# Patient Record
Sex: MALE | Race: BLACK OR AFRICAN AMERICAN | NOT HISPANIC OR LATINO | URBAN - METROPOLITAN AREA
[De-identification: names, ages, dates, MRNs, and addresses within clinical notes are randomized per-mention and may not be internally consistent; named-entity substitution may affect disease eponyms.]

---

## 2018-12-21 ENCOUNTER — EMERGENCY (EMERGENCY)
Facility: HOSPITAL | Age: 68
LOS: 1 days | Discharge: HOME | End: 2018-12-21
Attending: EMERGENCY MEDICINE
Payer: MEDICARE

## 2018-12-21 VITALS
RESPIRATION RATE: 16 BRPM | SYSTOLIC BLOOD PRESSURE: 152 MMHG | WEIGHT: 205.03 LBS | HEIGHT: 70 IN | DIASTOLIC BLOOD PRESSURE: 74 MMHG | OXYGEN SATURATION: 97 % | TEMPERATURE: 97 F | HEART RATE: 78 BPM

## 2018-12-21 DIAGNOSIS — D17.9 BENIGN LIPOMATOUS NEOPLASM, UNSPECIFIED: Chronic | ICD-10-CM

## 2018-12-21 DIAGNOSIS — Z98.89 OTHER SPECIFIED POSTPROCEDURAL STATES: Chronic | ICD-10-CM

## 2018-12-21 DIAGNOSIS — Z95.1 PRESENCE OF AORTOCORONARY BYPASS GRAFT: Chronic | ICD-10-CM

## 2018-12-21 PROCEDURE — 99284 EMERGENCY DEPT VISIT MOD MDM: CPT

## 2018-12-21 NOTE — ED ADULT TRIAGE NOTE - CHIEF COMPLAINT QUOTE
pt arrive by ambulance stating "I don't feel too well", states he was planning to go out tonight but when he was ready to get his shoes on he realized "maybe I drank too many beers". as per EMS witnesses at home reported pt passed out for couple seconds while bending down to put shoes on.

## 2018-12-21 NOTE — ED ADULT NURSE REASSESSMENT NOTE - NS ED NURSE REASSESS COMMENT FT1
pt uncooperative and refusing to remove clothing or allow staff to evaluate. MD Weathers made aware, will evaluate. will continue to monitor.

## 2018-12-22 VITALS
TEMPERATURE: 98 F | RESPIRATION RATE: 18 BRPM | SYSTOLIC BLOOD PRESSURE: 149 MMHG | OXYGEN SATURATION: 100 % | HEART RATE: 89 BPM | DIASTOLIC BLOOD PRESSURE: 72 MMHG

## 2018-12-22 LAB
ALBUMIN SERPL ELPH-MCNC: 4 G/DL — SIGNIFICANT CHANGE UP (ref 3.3–5.2)
ALP SERPL-CCNC: 58 U/L — SIGNIFICANT CHANGE UP (ref 40–120)
ALT FLD-CCNC: 39 U/L — SIGNIFICANT CHANGE UP
ANION GAP SERPL CALC-SCNC: 12 MMOL/L — SIGNIFICANT CHANGE UP (ref 5–17)
AST SERPL-CCNC: 29 U/L — SIGNIFICANT CHANGE UP
BASOPHILS # BLD AUTO: 0 K/UL — SIGNIFICANT CHANGE UP (ref 0–0.2)
BASOPHILS NFR BLD AUTO: 0.4 % — SIGNIFICANT CHANGE UP (ref 0–2)
BILIRUB SERPL-MCNC: 0.5 MG/DL — SIGNIFICANT CHANGE UP (ref 0.4–2)
BUN SERPL-MCNC: 20 MG/DL — SIGNIFICANT CHANGE UP (ref 8–20)
CALCIUM SERPL-MCNC: 9.1 MG/DL — SIGNIFICANT CHANGE UP (ref 8.6–10.2)
CHLORIDE SERPL-SCNC: 103 MMOL/L — SIGNIFICANT CHANGE UP (ref 98–107)
CK MB CFR SERPL CALC: 3.3 NG/ML — SIGNIFICANT CHANGE UP (ref 0–6.7)
CK SERPL-CCNC: 177 U/L — SIGNIFICANT CHANGE UP (ref 30–200)
CO2 SERPL-SCNC: 24 MMOL/L — SIGNIFICANT CHANGE UP (ref 22–29)
CREAT SERPL-MCNC: 1.21 MG/DL — SIGNIFICANT CHANGE UP (ref 0.5–1.3)
EOSINOPHIL # BLD AUTO: 0.4 K/UL — SIGNIFICANT CHANGE UP (ref 0–0.5)
EOSINOPHIL NFR BLD AUTO: 4.1 % — SIGNIFICANT CHANGE UP (ref 0–5)
ETHANOL SERPL-MCNC: 76 MG/DL — SIGNIFICANT CHANGE UP
GLUCOSE SERPL-MCNC: 139 MG/DL — HIGH (ref 70–115)
HCT VFR BLD CALC: 44 % — SIGNIFICANT CHANGE UP (ref 42–52)
HGB BLD-MCNC: 15 G/DL — SIGNIFICANT CHANGE UP (ref 14–18)
LYMPHOCYTES # BLD AUTO: 28.2 % — SIGNIFICANT CHANGE UP (ref 20–55)
LYMPHOCYTES # BLD AUTO: 3 K/UL — SIGNIFICANT CHANGE UP (ref 1–4.8)
MCHC RBC-ENTMCNC: 30.5 PG — SIGNIFICANT CHANGE UP (ref 27–31)
MCHC RBC-ENTMCNC: 34.1 G/DL — SIGNIFICANT CHANGE UP (ref 32–36)
MCV RBC AUTO: 89.6 FL — SIGNIFICANT CHANGE UP (ref 80–94)
MONOCYTES # BLD AUTO: 0.5 K/UL — SIGNIFICANT CHANGE UP (ref 0–0.8)
MONOCYTES NFR BLD AUTO: 5.1 % — SIGNIFICANT CHANGE UP (ref 3–10)
NEUTROPHILS # BLD AUTO: 6.6 K/UL — SIGNIFICANT CHANGE UP (ref 1.8–8)
NEUTROPHILS NFR BLD AUTO: 61.9 % — SIGNIFICANT CHANGE UP (ref 37–73)
PLATELET # BLD AUTO: 222 K/UL — SIGNIFICANT CHANGE UP (ref 150–400)
POTASSIUM SERPL-MCNC: 5 MMOL/L — SIGNIFICANT CHANGE UP (ref 3.5–5.3)
POTASSIUM SERPL-SCNC: 5 MMOL/L — SIGNIFICANT CHANGE UP (ref 3.5–5.3)
PROT SERPL-MCNC: 7.7 G/DL — SIGNIFICANT CHANGE UP (ref 6.6–8.7)
RBC # BLD: 4.91 M/UL — SIGNIFICANT CHANGE UP (ref 4.6–6.2)
RBC # FLD: 13.4 % — SIGNIFICANT CHANGE UP (ref 11–15.6)
SODIUM SERPL-SCNC: 139 MMOL/L — SIGNIFICANT CHANGE UP (ref 135–145)
TROPONIN T SERPL-MCNC: <0.01 NG/ML — SIGNIFICANT CHANGE UP (ref 0–0.06)
WBC # BLD: 10.7 K/UL — SIGNIFICANT CHANGE UP (ref 4.8–10.8)
WBC # FLD AUTO: 10.7 K/UL — SIGNIFICANT CHANGE UP (ref 4.8–10.8)

## 2018-12-22 PROCEDURE — 82553 CREATINE MB FRACTION: CPT

## 2018-12-22 PROCEDURE — 93010 ELECTROCARDIOGRAM REPORT: CPT

## 2018-12-22 PROCEDURE — 80053 COMPREHEN METABOLIC PANEL: CPT

## 2018-12-22 PROCEDURE — 36415 COLL VENOUS BLD VENIPUNCTURE: CPT

## 2018-12-22 PROCEDURE — 84484 ASSAY OF TROPONIN QUANT: CPT

## 2018-12-22 PROCEDURE — 80307 DRUG TEST PRSMV CHEM ANLYZR: CPT

## 2018-12-22 PROCEDURE — 99285 EMERGENCY DEPT VISIT HI MDM: CPT

## 2018-12-22 PROCEDURE — 82550 ASSAY OF CK (CPK): CPT

## 2018-12-22 PROCEDURE — 85027 COMPLETE CBC AUTOMATED: CPT

## 2018-12-22 PROCEDURE — 93005 ELECTROCARDIOGRAM TRACING: CPT

## 2018-12-22 RX ORDER — SODIUM CHLORIDE 9 MG/ML
3 INJECTION INTRAMUSCULAR; INTRAVENOUS; SUBCUTANEOUS ONCE
Qty: 0 | Refills: 0 | Status: DISCONTINUED | OUTPATIENT
Start: 2018-12-22 | End: 2018-12-26

## 2018-12-22 NOTE — ED PROVIDER NOTE - CONSTITUTIONAL, MLM
normal... Well appearing, well nourished, awake, alert, oriented to person, place, time/situation and in no apparent distress. Well appearing, well nourished, awake, alert, oriented to person, place, time/situation and in no apparent distress. EtOH evident on breath
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
## 2018-12-22 NOTE — ED ADULT NURSE NOTE - INTERVENTIONS DEFINITIONS
Monitor gait and stability/Stretcher in lowest position, wheels locked, appropriate side rails in place/Reinforce activity limits and safety measures with patient and family/Physically safe environment: no spills, clutter or unnecessary equipment

## 2018-12-22 NOTE — ED PROVIDER NOTE - MEDICAL DECISION MAKING DETAILS
68 y.o M presents with EtOH intoxication and syncopal episode 68 y.o M presents with EtOH intoxication and syncopal episode. Will check EKG, labs, EtOH levels and re-evaluate.

## 2018-12-22 NOTE — ED PROVIDER NOTE - PROGRESS NOTE DETAILS
Labs as noted.  Pt denies any c/o and ambulatory in ED without difficulty and requesting d/c Labs as noted.  EKG unchanged from previous. Pt denies any c/o and ambulatory in ED without difficulty and requesting d/c

## 2018-12-22 NOTE — ED ADULT NURSE NOTE - OBJECTIVE STATEMENT
Pt. BIB friends after one syncopal episode at home. CABG Hx. admits to drinking appx 6 beers. In ED patient awake alert and oriented. ambulating w/o difficulty. BP elevated. sees nephrologist, all other vital signs stable.

## 2019-11-08 ENCOUNTER — EMERGENCY (EMERGENCY)
Facility: HOSPITAL | Age: 69
LOS: 1 days | Discharge: HOME | End: 2019-11-08
Attending: EMERGENCY MEDICINE
Payer: MEDICARE

## 2019-11-08 VITALS
DIASTOLIC BLOOD PRESSURE: 94 MMHG | OXYGEN SATURATION: 98 % | SYSTOLIC BLOOD PRESSURE: 189 MMHG | RESPIRATION RATE: 18 BRPM | HEART RATE: 98 BPM | TEMPERATURE: 98 F | HEIGHT: 70 IN | WEIGHT: 195.11 LBS

## 2019-11-08 VITALS — DIASTOLIC BLOOD PRESSURE: 86 MMHG | HEART RATE: 76 BPM | SYSTOLIC BLOOD PRESSURE: 151 MMHG

## 2019-11-08 DIAGNOSIS — D17.9 BENIGN LIPOMATOUS NEOPLASM, UNSPECIFIED: Chronic | ICD-10-CM

## 2019-11-08 DIAGNOSIS — Z95.1 PRESENCE OF AORTOCORONARY BYPASS GRAFT: Chronic | ICD-10-CM

## 2019-11-08 DIAGNOSIS — Z98.89 OTHER SPECIFIED POSTPROCEDURAL STATES: Chronic | ICD-10-CM

## 2019-11-08 LAB
ALBUMIN SERPL ELPH-MCNC: 4.1 G/DL — SIGNIFICANT CHANGE UP (ref 3.3–5.2)
ALP SERPL-CCNC: 69 U/L — SIGNIFICANT CHANGE UP (ref 40–120)
ALT FLD-CCNC: 24 U/L — SIGNIFICANT CHANGE UP
ANION GAP SERPL CALC-SCNC: 12 MMOL/L — SIGNIFICANT CHANGE UP (ref 5–17)
AST SERPL-CCNC: 25 U/L — SIGNIFICANT CHANGE UP
BASOPHILS # BLD AUTO: 0.1 K/UL — SIGNIFICANT CHANGE UP (ref 0–0.2)
BASOPHILS NFR BLD AUTO: 1.1 % — SIGNIFICANT CHANGE UP (ref 0–2)
BILIRUB SERPL-MCNC: 0.7 MG/DL — SIGNIFICANT CHANGE UP (ref 0.4–2)
BUN SERPL-MCNC: 17 MG/DL — SIGNIFICANT CHANGE UP (ref 8–20)
CALCIUM SERPL-MCNC: 9.9 MG/DL — SIGNIFICANT CHANGE UP (ref 8.6–10.2)
CHLORIDE SERPL-SCNC: 101 MMOL/L — SIGNIFICANT CHANGE UP (ref 98–107)
CO2 SERPL-SCNC: 25 MMOL/L — SIGNIFICANT CHANGE UP (ref 22–29)
CREAT SERPL-MCNC: 1.19 MG/DL — SIGNIFICANT CHANGE UP (ref 0.5–1.3)
EOSINOPHIL # BLD AUTO: 0.39 K/UL — SIGNIFICANT CHANGE UP (ref 0–0.5)
EOSINOPHIL NFR BLD AUTO: 4.2 % — SIGNIFICANT CHANGE UP (ref 0–6)
GLUCOSE SERPL-MCNC: 130 MG/DL — HIGH (ref 70–115)
HCT VFR BLD CALC: 47.7 % — SIGNIFICANT CHANGE UP (ref 39–50)
HGB BLD-MCNC: 16.4 G/DL — SIGNIFICANT CHANGE UP (ref 13–17)
IMM GRANULOCYTES NFR BLD AUTO: 0.3 % — SIGNIFICANT CHANGE UP (ref 0–1.5)
LYMPHOCYTES # BLD AUTO: 2.55 K/UL — SIGNIFICANT CHANGE UP (ref 1–3.3)
LYMPHOCYTES # BLD AUTO: 27.5 % — SIGNIFICANT CHANGE UP (ref 13–44)
MCHC RBC-ENTMCNC: 31 PG — SIGNIFICANT CHANGE UP (ref 27–34)
MCHC RBC-ENTMCNC: 34.4 GM/DL — SIGNIFICANT CHANGE UP (ref 32–36)
MCV RBC AUTO: 90.2 FL — SIGNIFICANT CHANGE UP (ref 80–100)
MONOCYTES # BLD AUTO: 0.85 K/UL — SIGNIFICANT CHANGE UP (ref 0–0.9)
MONOCYTES NFR BLD AUTO: 9.2 % — SIGNIFICANT CHANGE UP (ref 2–14)
NEUTROPHILS # BLD AUTO: 5.34 K/UL — SIGNIFICANT CHANGE UP (ref 1.8–7.4)
NEUTROPHILS NFR BLD AUTO: 57.7 % — SIGNIFICANT CHANGE UP (ref 43–77)
PLATELET # BLD AUTO: 246 K/UL — SIGNIFICANT CHANGE UP (ref 150–400)
POTASSIUM SERPL-MCNC: 4.6 MMOL/L — SIGNIFICANT CHANGE UP (ref 3.5–5.3)
POTASSIUM SERPL-SCNC: 4.6 MMOL/L — SIGNIFICANT CHANGE UP (ref 3.5–5.3)
PROT SERPL-MCNC: 8.2 G/DL — SIGNIFICANT CHANGE UP (ref 6.6–8.7)
RBC # BLD: 5.29 M/UL — SIGNIFICANT CHANGE UP (ref 4.2–5.8)
RBC # FLD: 13.1 % — SIGNIFICANT CHANGE UP (ref 10.3–14.5)
SODIUM SERPL-SCNC: 138 MMOL/L — SIGNIFICANT CHANGE UP (ref 135–145)
TROPONIN T SERPL-MCNC: <0.01 NG/ML — SIGNIFICANT CHANGE UP (ref 0–0.06)
TROPONIN T SERPL-MCNC: <0.01 NG/ML — SIGNIFICANT CHANGE UP (ref 0–0.06)
WBC # BLD: 9.26 K/UL — SIGNIFICANT CHANGE UP (ref 3.8–10.5)
WBC # FLD AUTO: 9.26 K/UL — SIGNIFICANT CHANGE UP (ref 3.8–10.5)

## 2019-11-08 PROCEDURE — 96374 THER/PROPH/DIAG INJ IV PUSH: CPT

## 2019-11-08 PROCEDURE — 71045 X-RAY EXAM CHEST 1 VIEW: CPT | Mod: 26

## 2019-11-08 PROCEDURE — 93010 ELECTROCARDIOGRAM REPORT: CPT

## 2019-11-08 PROCEDURE — 85027 COMPLETE CBC AUTOMATED: CPT

## 2019-11-08 PROCEDURE — 99284 EMERGENCY DEPT VISIT MOD MDM: CPT

## 2019-11-08 PROCEDURE — 71045 X-RAY EXAM CHEST 1 VIEW: CPT

## 2019-11-08 PROCEDURE — 96376 TX/PRO/DX INJ SAME DRUG ADON: CPT

## 2019-11-08 PROCEDURE — 99284 EMERGENCY DEPT VISIT MOD MDM: CPT | Mod: 25

## 2019-11-08 PROCEDURE — 84484 ASSAY OF TROPONIN QUANT: CPT

## 2019-11-08 PROCEDURE — 93005 ELECTROCARDIOGRAM TRACING: CPT

## 2019-11-08 PROCEDURE — 36415 COLL VENOUS BLD VENIPUNCTURE: CPT

## 2019-11-08 PROCEDURE — 80053 COMPREHEN METABOLIC PANEL: CPT

## 2019-11-08 RX ORDER — RAMIPRIL 5 MG
1 CAPSULE ORAL
Qty: 14 | Refills: 0
Start: 2019-11-08 | End: 2019-11-21

## 2019-11-08 RX ORDER — HYDRALAZINE HCL 50 MG
5 TABLET ORAL ONCE
Refills: 0 | Status: COMPLETED | OUTPATIENT
Start: 2019-11-08 | End: 2019-11-08

## 2019-11-08 RX ADMIN — Medication 5 MILLIGRAM(S): at 16:55

## 2019-11-08 RX ADMIN — Medication 5 MILLIGRAM(S): at 17:27

## 2019-11-08 NOTE — ED PROVIDER NOTE - CHPI ED SYMPTOMS NEG
no diaphoresis/no cough/no shortness of breath/no vomiting/no nausea/no syncope/no chills/no back pain/no dizziness/no fever
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
## 2019-11-08 NOTE — ED PROVIDER NOTE - PATIENT PORTAL LINK FT
You can access the FollowMyHealth Patient Portal offered by [facility] by registering at the following website: [email or web address]. By joining [facility]’s FollowMyHealth portal, you will also be able to view your health information using other applications (apps) compatible with our system.

## 2019-11-08 NOTE — ED PROVIDER NOTE - PROGRESS NOTE DETAILS
Spoke with [facility], states pt can go home after 2 trops and f/u in the office next week as pt already had Echo and nuclear stress test done last week. 2 trops neg, HTN has improved. Will d/c. Advised pt to f/u with already scheduled cardiologist appointment next week. Return precautions provided.
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
## 2019-11-08 NOTE — ED PROVIDER NOTE - PHYSICAL EXAMINATION
Skin with no rashes, bruises, intact   Abd soft and nontender, non distended, nl BS   Nl s1,s2, no murmurs, rubs or gallops   Lungs CTA, = chest rise and fall, speaking in full sentences, no respiratory distress    A/O x 3, no focal deficits
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
## 2020-11-09 ENCOUNTER — EMERGENCY (EMERGENCY)
Facility: HOSPITAL | Age: 70
LOS: 1 days | Discharge: HOME | End: 2020-11-09
Attending: EMERGENCY MEDICINE
Payer: MEDICARE

## 2020-11-09 VITALS
RESPIRATION RATE: 18 BRPM | HEIGHT: 70 IN | WEIGHT: 179.9 LBS | OXYGEN SATURATION: 99 % | TEMPERATURE: 98 F | SYSTOLIC BLOOD PRESSURE: 237 MMHG | HEART RATE: 82 BPM | DIASTOLIC BLOOD PRESSURE: 108 MMHG

## 2020-11-09 VITALS — SYSTOLIC BLOOD PRESSURE: 214 MMHG | DIASTOLIC BLOOD PRESSURE: 98 MMHG

## 2020-11-09 DIAGNOSIS — Z95.1 PRESENCE OF AORTOCORONARY BYPASS GRAFT: Chronic | ICD-10-CM

## 2020-11-09 DIAGNOSIS — Z98.89 OTHER SPECIFIED POSTPROCEDURAL STATES: Chronic | ICD-10-CM

## 2020-11-09 DIAGNOSIS — D17.9 BENIGN LIPOMATOUS NEOPLASM, UNSPECIFIED: Chronic | ICD-10-CM

## 2020-11-09 LAB
ANION GAP SERPL CALC-SCNC: 11 MMOL/L — SIGNIFICANT CHANGE UP (ref 5–17)
BUN SERPL-MCNC: 17 MG/DL — SIGNIFICANT CHANGE UP (ref 8–20)
CALCIUM SERPL-MCNC: 9.6 MG/DL — SIGNIFICANT CHANGE UP (ref 8.6–10.2)
CHLORIDE SERPL-SCNC: 101 MMOL/L — SIGNIFICANT CHANGE UP (ref 98–107)
CO2 SERPL-SCNC: 25 MMOL/L — SIGNIFICANT CHANGE UP (ref 22–29)
CREAT SERPL-MCNC: 1.07 MG/DL — SIGNIFICANT CHANGE UP (ref 0.5–1.3)
GLUCOSE SERPL-MCNC: 97 MG/DL — SIGNIFICANT CHANGE UP (ref 70–99)
POTASSIUM SERPL-MCNC: 4.2 MMOL/L — SIGNIFICANT CHANGE UP (ref 3.5–5.3)
POTASSIUM SERPL-SCNC: 4.2 MMOL/L — SIGNIFICANT CHANGE UP (ref 3.5–5.3)
SODIUM SERPL-SCNC: 137 MMOL/L — SIGNIFICANT CHANGE UP (ref 135–145)
TROPONIN T SERPL-MCNC: <0.01 NG/ML — SIGNIFICANT CHANGE UP (ref 0–0.06)

## 2020-11-09 PROCEDURE — 36415 COLL VENOUS BLD VENIPUNCTURE: CPT

## 2020-11-09 PROCEDURE — 99285 EMERGENCY DEPT VISIT HI MDM: CPT

## 2020-11-09 PROCEDURE — 96374 THER/PROPH/DIAG INJ IV PUSH: CPT

## 2020-11-09 PROCEDURE — 99284 EMERGENCY DEPT VISIT MOD MDM: CPT | Mod: 25

## 2020-11-09 PROCEDURE — 93005 ELECTROCARDIOGRAM TRACING: CPT

## 2020-11-09 PROCEDURE — 93010 ELECTROCARDIOGRAM REPORT: CPT

## 2020-11-09 PROCEDURE — 84484 ASSAY OF TROPONIN QUANT: CPT

## 2020-11-09 PROCEDURE — 80048 BASIC METABOLIC PNL TOTAL CA: CPT

## 2020-11-09 RX ORDER — ASPIRIN/CALCIUM CARB/MAGNESIUM 324 MG
81 TABLET ORAL DAILY
Refills: 0 | Status: DISCONTINUED | OUTPATIENT
Start: 2020-11-09 | End: 2020-11-14

## 2020-11-09 RX ORDER — METOPROLOL TARTRATE 50 MG
50 TABLET ORAL DAILY
Refills: 0 | Status: DISCONTINUED | OUTPATIENT
Start: 2020-11-09 | End: 2020-11-14

## 2020-11-09 RX ORDER — LISINOPRIL 2.5 MG/1
40 TABLET ORAL DAILY
Refills: 0 | Status: DISCONTINUED | OUTPATIENT
Start: 2020-11-09 | End: 2020-11-14

## 2020-11-09 RX ORDER — METOCLOPRAMIDE HCL 10 MG
10 TABLET ORAL ONCE
Refills: 0 | Status: COMPLETED | OUTPATIENT
Start: 2020-11-09 | End: 2020-11-09

## 2020-11-09 RX ADMIN — Medication 50 MILLIGRAM(S): at 17:44

## 2020-11-09 RX ADMIN — LISINOPRIL 40 MILLIGRAM(S): 2.5 TABLET ORAL at 17:44

## 2020-11-09 RX ADMIN — Medication 81 MILLIGRAM(S): at 17:44

## 2020-11-09 RX ADMIN — Medication 10 MILLIGRAM(S): at 17:44
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
## 2020-11-09 NOTE — ED PROVIDER NOTE - PATIENT PORTAL LINK FT
You can access the FollowMyHealth Patient Portal offered by [facility] by registering at the following website: [email or web address]. By joining [facility]’s FollowMyHealth portal, you will also be able to view your health information using other applications (apps) compatible with our system.

## 2020-11-09 NOTE — ED PROVIDER NOTE - PHYSICAL EXAMINATION
Const: Awake, alert and oriented. In no acute distress. Well appearing.  HEENT: NC/AT. Moist mucous membranes.  Eyes: No scleral icterus. EOMI.  Neck:. Soft and supple. Full ROM without pain.  Cardiac: Regular rate and regular rhythm. +S1/S2. No murmurs. Peripheral pulses 2+ and symmetric. No LE edema.  Resp: Speaking in full sentences. No evidence of respiratory distress. No wheezes, rales or rhonchi.  Abd: Soft, non-tender, non-distended. Normal bowel sounds in all 4 quadrants. No guarding or rebound.  Back: Spine midline and non-tender. No CVAT.  Skin: No rashes, abrasions or lacerations.  Neuro: CN II-XII grossly in tact. Symmetrical smile. PERRL. EOMI. Bilateral and symmetric sensation of face. Tongue midline. 5/5  strength symmetrically. No pronator drift.  Sensation symmetrically intact bilateral upper and lower extremities.
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
## 2020-11-09 NOTE — ED PROVIDER NOTE - CARE PLAN
Principal Discharge DX:	Elevated blood pressure reading  Secondary Diagnosis:	Acute non intractable tension-type headache

## 2020-11-09 NOTE — ED PROVIDER NOTE - NSFOLLOWUPINSTRUCTIONS_ED_ALL_ED_FT
Hypertension, Adult      Hypertension is another name for high blood pressure. High blood pressure forces your heart to work harder to pump blood. This can cause problems over time.    There are two numbers in a blood pressure reading. There is a top number (systolic) over a bottom number (diastolic). It is best to have a blood pressure that is below 120/80. Healthy choices can help lower your blood pressure, or you may need medicine to help lower it.      What are the causes?    The cause of this condition is not known. Some conditions may be related to high blood pressure.      What increases the risk?    •Smoking.      •Having type 2 diabetes mellitus, high cholesterol, or both.      •Not getting enough exercise or physical activity.      •Being overweight.      •Having too much fat, sugar, calories, or salt (sodium) in your diet.      •Drinking too much alcohol.      •Having long-term (chronic) kidney disease.      •Having a family history of high blood pressure.      •Age. Risk increases with age.      •Race. You may be at higher risk if you are .      •Gender. Men are at higher risk than women before age 45. After age 65, women are at higher risk than men.      •Having obstructive sleep apnea.      •Stress.        What are the signs or symptoms?  •High blood pressure may not cause symptoms. Very high blood pressure (hypertensive crisis) may cause:  •Headache.      •Feelings of worry or nervousness (anxiety).      •Shortness of breath.      •Nosebleed.      •A feeling of being sick to your stomach (nausea).      •Throwing up (vomiting).      •Changes in how you see.      •Very bad chest pain.      •Seizures.          How is this treated?  •This condition is treated by making healthy lifestyle changes, such as:  •Eating healthy foods.      •Exercising more.      •Drinking less alcohol.      •Your health care provider may prescribe medicine if lifestyle changes are not enough to get your blood pressure under control, and if:  •Your top number is above 130.      •Your bottom number is above 80.        •Your personal target blood pressure may vary.        Follow these instructions at home:      Eating and drinking    •If told, follow the DASH eating plan. To follow this plan:  •Fill one half of your plate at each meal with fruits and vegetables.      •Fill one fourth of your plate at each meal with whole grains. Whole grains include whole-wheat pasta, brown rice, and whole-grain bread.      •Eat or drink low-fat dairy products, such as skim milk or low-fat yogurt.      •Fill one fourth of your plate at each meal with low-fat (lean) proteins. Low-fat proteins include fish, chicken without skin, eggs, beans, and tofu.      •Avoid fatty meat, cured and processed meat, or chicken with skin.      •Avoid pre-made or processed food.        •Eat less than 1,500 mg of salt each day.    • Do not drink alcohol if:  •Your doctor tells you not to drink.      •You are pregnant, may be pregnant, or are planning to become pregnant.      •If you drink alcohol:•Limit how much you use to:  •0–1 drink a day for women.      •0–2 drinks a day for men.        •Be aware of how much alcohol is in your drink. In the [place] one drink equals one 12 oz bottle of beer (355 mL), one 5 oz glass of wine (148 mL), or one 1½ oz glass of hard liquor (44 mL).          Lifestyle      •Work with your doctor to stay at a healthy weight or to lose weight. Ask your doctor what the best weight is for you.      •Get at least 30 minutes of exercise most days of the week. This may include walking, swimming, or biking.      •Get at least 30 minutes of exercise that strengthens your muscles (resistance exercise) at least 3 days a week. This may include lifting weights or doing Pilates.      • Do not use any products that contain nicotine or tobacco, such as cigarettes, e-cigarettes, and chewing tobacco. If you need help quitting, ask your doctor.      •Check your blood pressure at home as told by your doctor.      •Keep all follow-up visits as told by your doctor. This is important.      Medicines     •Take over-the-counter and prescription medicines only as told by your doctor. Follow directions carefully.      • Do not skip doses of blood pressure medicine. The medicine does not work as well if you skip doses. Skipping doses also puts you at risk for problems.      •Ask your doctor about side effects or reactions to medicines that you should watch for.        Contact a doctor if you:    •Think you are having a reaction to the medicine you are taking.      •Have headaches that keep coming back (recurring).      •Feel dizzy.      •Have swelling in your ankles.      •Have trouble with your vision.        Get help right away if you:    •Get a very bad headache.      •Start to feel mixed up (confused).      •Feel weak or numb.      •Feel faint.    •Have very bad pain in your:  •Chest.      •Belly (abdomen).        •Throw up more than once.      •Have trouble breathing.        Summary    •Hypertension is another name for high blood pressure.      •High blood pressure forces your heart to work harder to pump blood.      •For most people, a normal blood pressure is less than 120/80.      •Making healthy choices can help lower blood pressure. If your blood pressure does not get lower with healthy choices, you may need to take medicine.      This information is not intended to replace advice given to you by your health care provider. Make sure you discuss any questions you have with your health care provider.      Migraine Headache      A migraine headache is a very strong throbbing pain on one side or both sides of your head. This type of headache can also cause other symptoms. It can last from 4 hours to 3 days. Talk with your doctor about what things may bring on (trigger) this condition.      What are the causes?  The exact cause of this condition is not known. This condition may be triggered or caused by:  •Drinking alcohol.      •Smoking.    •Taking medicines, such as:  •Medicine used to treat chest pain (nitroglycerin).      •Birth control pills.      •Estrogen.      •Some blood pressure medicines.        •Eating or drinking certain products.      •Doing physical activity.    Other things that may trigger a migraine headache include:  •Having a menstrual period.      •Pregnancy.      •Hunger.      •Stress.      •Not getting enough sleep or getting too much sleep.      •Weather changes.      •Tiredness (fatigue).        What increases the risk?    •Being 25–55 years old.      •Being female.      •Having a family history of migraine headaches.      •Being .      •Having depression or anxiety.      •Being very overweight.        What are the signs or symptoms?  •A throbbing pain. This pain may:  •Happen in any area of the head, such as on one side or both sides.      •Make it hard to do daily activities.      •Get worse with physical activity.      •Get worse around bright lights or loud noises.      •Other symptoms may include:  •Feeling sick to your stomach (nauseous).      •Vomiting.      •Dizziness.      •Being sensitive to bright lights, loud noises, or smells.      •Before you get a migraine headache, you may get warning signs (an aura). An aura may include:  •Seeing flashing lights or having blind spots.      •Seeing bright spots, halos, or zigzag lines.      •Having tunnel vision or blurred vision.      •Having numbness or a tingling feeling.      •Having trouble talking.      •Having weak muscles.      •Some people have symptoms after a migraine headache (postdromal phase), such as:  •Tiredness.      •Trouble thinking (concentrating).          How is this treated?  •Taking medicines that:  •Relieve pain.      •Relieve the feeling of being sick to your stomach.      •Prevent migraine headaches.      •Treatment may also include:  •Having acupuncture.      •Avoiding foods that bring on migraine headaches.      •Learning ways to control your body functions (biofeedback).      •Therapy to help you know and deal with negative thoughts (cognitive behavioral therapy).          Follow these instructions at home:    Medicines     •Take over-the-counter and prescription medicines only as told by your doctor.    •Ask your doctor if the medicine prescribed to you:  •Requires you to avoid driving or using heavy machinery.    •Can cause trouble pooping (constipation). You may need to take these steps to prevent or treat trouble pooping:  •Drink enough fluid to keep your pee (urine) pale yellow.      •Take over-the-counter or prescription medicines.      •Eat foods that are high in fiber. These include beans, whole grains, and fresh fruits and vegetables.      •Limit foods that are high in fat and sugar. These include fried or sweet foods.          Lifestyle     • Do not drink alcohol.      • Do not use any products that contain nicotine or tobacco, such as cigarettes, e-cigarettes, and chewing tobacco. If you need help quitting, ask your doctor.      •Get at least 8 hours of sleep every night.      •Limit and deal with stress.        General instructions                 •Keep a journal to find out what may bring on your migraine headaches. For example, write down:  •What you eat and drink.      •How much sleep you get.      •Any change in what you eat or drink.      •Any change in your medicines.      •If you have a migraine headache:  •Avoid things that make your symptoms worse, such as bright lights.      •It may help to lie down in a dark, quiet room.      •Do not drive or use heavy machinery.      •Ask your doctor what activities are safe for you.        •Keep all follow-up visits as told by your doctor. This is important.        Contact a doctor if:    •You get a migraine headache that is different or worse than others you have had.      •You have more than 15 headache days in one month.        Get help right away if:    •Your migraine headache gets very bad.      •Your migraine headache lasts longer than 72 hours.      •You have a fever.      •You have a stiff neck.      •You have trouble seeing.      •Your muscles feel weak or like you cannot control them.      •You start to lose your balance a lot.      •You start to have trouble walking.      •You pass out (faint).      •You have a seizure.        Summary    •A migraine headache is a very strong throbbing pain on one side or both sides of your head. These headaches can also cause other symptoms.      •This condition may be treated with medicines and changes to your lifestyle.      •Keep a journal to find out what may bring on your migraine headaches.      •Contact a doctor if you get a migraine headache that is different or worse than others you have had.      •Contact your doctor if you have more than 15 headache days in a month.      This information is not intended to replace advice given to you by your health care provider. Make sure you discuss any questions you have with your health care provider.

## 2020-11-09 NOTE — ED PROVIDER NOTE - NS ED ROS FT
Const: Denies fever, chills  HEENT: Denies blurry vision, sore throat  Neck: Denies neck pain/stiffness  Resp: Denies coughing, SOB  Cardiovascular: Denies CP, palpitations, LE edema  GI: Denies nausea, vomiting, abdominal pain, diarrhea, constipation, blood in stool  : Denies urinary frequency/urgency/dysuria, hematuria  MSK: Denies back pain  Neuro: + HA. Denies dizziness, numbness, weakness  Skin: Denies rashes.

## 2021-01-15 ENCOUNTER — APPOINTMENT (OUTPATIENT)
Dept: COLORECTAL SURGERY | Facility: CLINIC | Age: 71
End: 2021-01-15

## 2021-02-03 ENCOUNTER — EMERGENCY (EMERGENCY)
Facility: HOSPITAL | Age: 71
LOS: 1 days | Discharge: HOME | End: 2021-02-03
Attending: EMERGENCY MEDICINE
Payer: MEDICARE

## 2021-02-03 VITALS
DIASTOLIC BLOOD PRESSURE: 55 MMHG | WEIGHT: 179.9 LBS | HEART RATE: 65 BPM | OXYGEN SATURATION: 96 % | TEMPERATURE: 98 F | HEIGHT: 70 IN | SYSTOLIC BLOOD PRESSURE: 89 MMHG | RESPIRATION RATE: 18 BRPM

## 2021-02-03 VITALS
DIASTOLIC BLOOD PRESSURE: 79 MMHG | OXYGEN SATURATION: 97 % | HEART RATE: 69 BPM | RESPIRATION RATE: 18 BRPM | SYSTOLIC BLOOD PRESSURE: 154 MMHG | TEMPERATURE: 98 F

## 2021-02-03 DIAGNOSIS — Z98.89 OTHER SPECIFIED POSTPROCEDURAL STATES: Chronic | ICD-10-CM

## 2021-02-03 DIAGNOSIS — D17.9 BENIGN LIPOMATOUS NEOPLASM, UNSPECIFIED: Chronic | ICD-10-CM

## 2021-02-03 DIAGNOSIS — Z95.1 PRESENCE OF AORTOCORONARY BYPASS GRAFT: Chronic | ICD-10-CM

## 2021-02-03 LAB
ALBUMIN SERPL ELPH-MCNC: 3.6 G/DL — SIGNIFICANT CHANGE UP (ref 3.3–5.2)
ALP SERPL-CCNC: 79 U/L — SIGNIFICANT CHANGE UP (ref 40–120)
ALT FLD-CCNC: 34 U/L — SIGNIFICANT CHANGE UP
ANION GAP SERPL CALC-SCNC: 12 MMOL/L — SIGNIFICANT CHANGE UP (ref 5–17)
APTT BLD: 25 SEC — LOW (ref 27.5–35.5)
AST SERPL-CCNC: 28 U/L — SIGNIFICANT CHANGE UP
BASE EXCESS BLDV CALC-SCNC: -2.5 MMOL/L — LOW (ref -2–2)
BASOPHILS # BLD AUTO: 0.04 K/UL — SIGNIFICANT CHANGE UP (ref 0–0.2)
BASOPHILS NFR BLD AUTO: 0.5 % — SIGNIFICANT CHANGE UP (ref 0–2)
BILIRUB SERPL-MCNC: 0.8 MG/DL — SIGNIFICANT CHANGE UP (ref 0.4–2)
BUN SERPL-MCNC: 23 MG/DL — HIGH (ref 8–20)
CA-I SERPL-SCNC: 1.02 MMOL/L — LOW (ref 1.15–1.33)
CALCIUM SERPL-MCNC: 9.2 MG/DL — SIGNIFICANT CHANGE UP (ref 8.6–10.2)
CHLORIDE BLDV-SCNC: 103 MMOL/L — SIGNIFICANT CHANGE UP (ref 98–107)
CHLORIDE SERPL-SCNC: 101 MMOL/L — SIGNIFICANT CHANGE UP (ref 98–107)
CO2 SERPL-SCNC: 22 MMOL/L — SIGNIFICANT CHANGE UP (ref 22–29)
CREAT SERPL-MCNC: 1.64 MG/DL — HIGH (ref 0.5–1.3)
EOSINOPHIL # BLD AUTO: 0.1 K/UL — SIGNIFICANT CHANGE UP (ref 0–0.5)
EOSINOPHIL NFR BLD AUTO: 1.1 % — SIGNIFICANT CHANGE UP (ref 0–6)
GAS PNL BLDV: 134 MMOL/L — LOW (ref 135–145)
GAS PNL BLDV: SIGNIFICANT CHANGE UP
GAS PNL BLDV: SIGNIFICANT CHANGE UP
GLUCOSE BLDV-MCNC: 131 MG/DL — HIGH (ref 70–99)
GLUCOSE SERPL-MCNC: 125 MG/DL — HIGH (ref 70–99)
HCO3 BLDV-SCNC: 22 MMOL/L — SIGNIFICANT CHANGE UP (ref 20–26)
HCT VFR BLD CALC: 41.6 % — SIGNIFICANT CHANGE UP (ref 39–50)
HCT VFR BLD CALC: 45.6 % — SIGNIFICANT CHANGE UP (ref 39–50)
HCT VFR BLDA CALC: 51 — HIGH (ref 39–50)
HGB BLD CALC-MCNC: 16.8 G/DL — SIGNIFICANT CHANGE UP (ref 13–17)
HGB BLD-MCNC: 14.5 G/DL — SIGNIFICANT CHANGE UP (ref 13–17)
HGB BLD-MCNC: 15.9 G/DL — SIGNIFICANT CHANGE UP (ref 13–17)
IMM GRANULOCYTES NFR BLD AUTO: 0.2 % — SIGNIFICANT CHANGE UP (ref 0–1.5)
INR BLD: 1.17 RATIO — HIGH (ref 0.88–1.16)
LACTATE BLDV-MCNC: 2.5 MMOL/L — HIGH (ref 0.5–2)
LYMPHOCYTES # BLD AUTO: 3.08 K/UL — SIGNIFICANT CHANGE UP (ref 1–3.3)
LYMPHOCYTES # BLD AUTO: 35 % — SIGNIFICANT CHANGE UP (ref 13–44)
MCHC RBC-ENTMCNC: 30.3 PG — SIGNIFICANT CHANGE UP (ref 27–34)
MCHC RBC-ENTMCNC: 30.7 PG — SIGNIFICANT CHANGE UP (ref 27–34)
MCHC RBC-ENTMCNC: 34.9 GM/DL — SIGNIFICANT CHANGE UP (ref 32–36)
MCHC RBC-ENTMCNC: 34.9 GM/DL — SIGNIFICANT CHANGE UP (ref 32–36)
MCV RBC AUTO: 87 FL — SIGNIFICANT CHANGE UP (ref 80–100)
MCV RBC AUTO: 88 FL — SIGNIFICANT CHANGE UP (ref 80–100)
MONOCYTES # BLD AUTO: 1.1 K/UL — HIGH (ref 0–0.9)
MONOCYTES NFR BLD AUTO: 12.5 % — SIGNIFICANT CHANGE UP (ref 2–14)
NEUTROPHILS # BLD AUTO: 4.47 K/UL — SIGNIFICANT CHANGE UP (ref 1.8–7.4)
NEUTROPHILS NFR BLD AUTO: 50.7 % — SIGNIFICANT CHANGE UP (ref 43–77)
OTHER CELLS CSF MANUAL: 16 ML/DL — LOW (ref 18–22)
PCO2 BLDV: 50 MMHG — SIGNIFICANT CHANGE UP (ref 35–50)
PH BLDV: 7.3 — LOW (ref 7.32–7.43)
PLATELET # BLD AUTO: 180 K/UL — SIGNIFICANT CHANGE UP (ref 150–400)
PLATELET # BLD AUTO: 211 K/UL — SIGNIFICANT CHANGE UP (ref 150–400)
PO2 BLDV: 40 MMHG — SIGNIFICANT CHANGE UP (ref 25–45)
POTASSIUM BLDV-SCNC: 8.4 MMOL/L — CRITICAL HIGH (ref 3.4–4.5)
POTASSIUM SERPL-MCNC: 4.7 MMOL/L — SIGNIFICANT CHANGE UP (ref 3.5–5.3)
POTASSIUM SERPL-SCNC: 4.7 MMOL/L — SIGNIFICANT CHANGE UP (ref 3.5–5.3)
PROT SERPL-MCNC: 7.9 G/DL — SIGNIFICANT CHANGE UP (ref 6.6–8.7)
PROTHROM AB SERPL-ACNC: 13.5 SEC — SIGNIFICANT CHANGE UP (ref 10.6–13.6)
RBC # BLD: 4.78 M/UL — SIGNIFICANT CHANGE UP (ref 4.2–5.8)
RBC # BLD: 5.18 M/UL — SIGNIFICANT CHANGE UP (ref 4.2–5.8)
RBC # FLD: 13.1 % — SIGNIFICANT CHANGE UP (ref 10.3–14.5)
RBC # FLD: 13.1 % — SIGNIFICANT CHANGE UP (ref 10.3–14.5)
SAO2 % BLDV: 73 % — SIGNIFICANT CHANGE UP
SODIUM SERPL-SCNC: 134 MMOL/L — LOW (ref 135–145)
TROPONIN T SERPL-MCNC: <0.01 NG/ML — SIGNIFICANT CHANGE UP (ref 0–0.06)
WBC # BLD: 7.55 K/UL — SIGNIFICANT CHANGE UP (ref 3.8–10.5)
WBC # BLD: 8.81 K/UL — SIGNIFICANT CHANGE UP (ref 3.8–10.5)
WBC # FLD AUTO: 7.55 K/UL — SIGNIFICANT CHANGE UP (ref 3.8–10.5)
WBC # FLD AUTO: 8.81 K/UL — SIGNIFICANT CHANGE UP (ref 3.8–10.5)

## 2021-02-03 PROCEDURE — 93880 EXTRACRANIAL BILAT STUDY: CPT

## 2021-02-03 PROCEDURE — 84132 ASSAY OF SERUM POTASSIUM: CPT

## 2021-02-03 PROCEDURE — 85018 HEMOGLOBIN: CPT

## 2021-02-03 PROCEDURE — 80053 COMPREHEN METABOLIC PANEL: CPT

## 2021-02-03 PROCEDURE — 84484 ASSAY OF TROPONIN QUANT: CPT

## 2021-02-03 PROCEDURE — 71045 X-RAY EXAM CHEST 1 VIEW: CPT

## 2021-02-03 PROCEDURE — 93880 EXTRACRANIAL BILAT STUDY: CPT | Mod: 26

## 2021-02-03 PROCEDURE — 85014 HEMATOCRIT: CPT

## 2021-02-03 PROCEDURE — 82435 ASSAY OF BLOOD CHLORIDE: CPT

## 2021-02-03 PROCEDURE — 85027 COMPLETE CBC AUTOMATED: CPT

## 2021-02-03 PROCEDURE — 99284 EMERGENCY DEPT VISIT MOD MDM: CPT | Mod: 25

## 2021-02-03 PROCEDURE — 82962 GLUCOSE BLOOD TEST: CPT

## 2021-02-03 PROCEDURE — 71045 X-RAY EXAM CHEST 1 VIEW: CPT | Mod: 26

## 2021-02-03 PROCEDURE — 99236 HOSP IP/OBS SAME DATE HI 85: CPT

## 2021-02-03 PROCEDURE — 93010 ELECTROCARDIOGRAM REPORT: CPT | Mod: 76,77

## 2021-02-03 PROCEDURE — 96374 THER/PROPH/DIAG INJ IV PUSH: CPT

## 2021-02-03 PROCEDURE — 82803 BLOOD GASES ANY COMBINATION: CPT

## 2021-02-03 PROCEDURE — 93010 ELECTROCARDIOGRAM REPORT: CPT

## 2021-02-03 PROCEDURE — 82947 ASSAY GLUCOSE BLOOD QUANT: CPT

## 2021-02-03 PROCEDURE — 82330 ASSAY OF CALCIUM: CPT

## 2021-02-03 PROCEDURE — 85610 PROTHROMBIN TIME: CPT

## 2021-02-03 PROCEDURE — 36415 COLL VENOUS BLD VENIPUNCTURE: CPT

## 2021-02-03 PROCEDURE — G0378: CPT

## 2021-02-03 PROCEDURE — 83605 ASSAY OF LACTIC ACID: CPT

## 2021-02-03 PROCEDURE — 93005 ELECTROCARDIOGRAM TRACING: CPT

## 2021-02-03 PROCEDURE — 85025 COMPLETE CBC W/AUTO DIFF WBC: CPT

## 2021-02-03 PROCEDURE — 85730 THROMBOPLASTIN TIME PARTIAL: CPT

## 2021-02-03 PROCEDURE — 84295 ASSAY OF SERUM SODIUM: CPT

## 2021-02-03 RX ORDER — SODIUM CHLORIDE 9 MG/ML
2000 INJECTION INTRAMUSCULAR; INTRAVENOUS; SUBCUTANEOUS ONCE
Refills: 0 | Status: COMPLETED | OUTPATIENT
Start: 2021-02-03 | End: 2021-02-03

## 2021-02-03 RX ORDER — LISINOPRIL 2.5 MG/1
40 TABLET ORAL DAILY
Refills: 0 | Status: DISCONTINUED | OUTPATIENT
Start: 2021-02-03 | End: 2021-02-08

## 2021-02-03 RX ORDER — ASPIRIN/CALCIUM CARB/MAGNESIUM 324 MG
81 TABLET ORAL DAILY
Refills: 0 | Status: DISCONTINUED | OUTPATIENT
Start: 2021-02-03 | End: 2021-02-08

## 2021-02-03 RX ORDER — METOPROLOL TARTRATE 50 MG
50 TABLET ORAL DAILY
Refills: 0 | Status: DISCONTINUED | OUTPATIENT
Start: 2021-02-03 | End: 2021-02-08

## 2021-02-03 RX ORDER — ATORVASTATIN CALCIUM 80 MG/1
40 TABLET, FILM COATED ORAL AT BEDTIME
Refills: 0 | Status: DISCONTINUED | OUTPATIENT
Start: 2021-02-03 | End: 2021-02-08

## 2021-02-03 RX ORDER — CALCIUM GLUCONATE 100 MG/ML
1 VIAL (ML) INTRAVENOUS ONCE
Refills: 0 | Status: COMPLETED | OUTPATIENT
Start: 2021-02-03 | End: 2021-02-03

## 2021-02-03 RX ADMIN — Medication 100 GRAM(S): at 14:43

## 2021-02-03 RX ADMIN — SODIUM CHLORIDE 2000 MILLILITER(S): 9 INJECTION INTRAMUSCULAR; INTRAVENOUS; SUBCUTANEOUS at 13:00

## 2021-02-03 NOTE — ED ADULT TRIAGE NOTE - CHIEF COMPLAINT QUOTE
Pt BIBA for multiple syncopal episodes, had three PTA to ED, pt urinated himself on third episode, pt hypotensive upon arrival, pt states he smoked marijuana today that he bought off the street, AOx3

## 2021-02-03 NOTE — ED CDU PROVIDER DISPOSITION NOTE - ATTENDING CONTRIBUTION TO CARE
Pt. was admitted to observation unit for syncope. Pt. cleared by cardiology. Pt. stable for discharge. I, Dr. Vines, performed a face to face bedside interview with this patient regarding history of present illness, review of symptoms and relevant past medical, social and family history.  I completed an independent physical examination.  I have also reviewed the ACP's note(s) and discussed the plan with the ACP.

## 2021-02-03 NOTE — ED CDU PROVIDER INITIAL DAY NOTE - ATTENDING CONTRIBUTION TO CARE
Pt. admitted to observation unit for syncope. Cardiology to follow. I, Dr. Vines, performed a face to face bedside interview with this patient regarding history of present illness, review of symptoms and relevant past medical, social and family history.  I completed an independent physical examination.  I have also reviewed the ACP's note(s) and discussed the plan with the ACP.

## 2021-02-03 NOTE — ED CDU PROVIDER DISPOSITION NOTE - CLINICAL COURSE
69 yo male presenting to ER with syncopal episode at home, placed in observation for tele monitoring MRI and us doppler. cleared by cardiology and as per syncope protocol carotid doppler ordered, found to have 50%+ stenosis of left carotid can fu outpatient with vascular. instructed to fu with pmd and hydrate due to elevated CRT. verbalizes understanding

## 2021-02-03 NOTE — ED PROVIDER NOTE - CLINICAL SUMMARY MEDICAL DECISION MAKING FREE TEXT BOX
hypotense, liklely related to substance use just prior though twi throughout ekg not on ekg from several months ago. no active cp, denies other drugs besides thc, no sob. labs, fluids, [place] cards consulted.

## 2021-02-03 NOTE — ED CDU PROVIDER INITIAL DAY NOTE - PHYSICAL EXAMINATION
Vital signs noted, see flowsheet.  General: NAD, well appearing and non-toxic.  HEENT: NC/AT. MMM. Conjunctiva and sclera clear b/l.  EOMI. PERRL.  Neck: Soft and supple, full ROM without pain.  Cardiac: RRR. +S1/S2. Peripheral pulses 2+ and symmetric b/l. No LE edema.  Respiratory: Speaking in full sentences, no evidence of respiratory distress. Lungs CTA b/l, no wheezes/rhonchi/rales/stridor.   Abdomen: Soft, NTND. No guarding or rebound tenderness. No suprapubic tenderness.  Back: Spine midline and non-tender. No CVAT.  Skin: Normal color for race, no evidence of rash, ecchymosis, cyanosis or jaundice.   Neuro: Awake, alert and oriented to person/place/time/situation. Moves all extremities spontaneously and symmetrically.  No focal deficits or facial droop.  CN II-XII intact.  Psych: Normal affect

## 2021-02-03 NOTE — CONSULT NOTE ADULT - SUBJECTIVE AND OBJECTIVE BOX
[facility], [facility]                                                    [address], [address], [place] [address]                                                         PHONE: [phone]    FAX: [phone] [address], [address], [place] [address]                                                 PHONE: [phone]    FAX: [phone]  *******************************************************************************  cc: syncope    HPI: 70M with syncopal episode s/p smoking weed. No CP or SOB. No palpitations PND or orthopnea. 3V  CABG 10/14/14    nuclear stress 11/4/19 fixed distal anteroapical and anteroapical ddefect. EF 59        INTERPRETATION OF TELEMETRY (personally reviewed): SR    PAST MEDICAL & SURGICAL HISTORY:  CAD (coronary artery disease)    Hyperlipidemia    S/P CABG x 3    Benign lipomatous tumor    S/P tonsillectomy        No Known Allergies      MEDICATIONS  (STANDING):  aspirin enteric coated 81 milliGRAM(s) Oral daily  atorvastatin 40 milliGRAM(s) Oral at bedtime  lisinopril 40 milliGRAM(s) Oral daily  metoprolol succinate ER 50 milliGRAM(s) Oral daily    MEDICATIONS  (PRN):      Vital Signs Last 24 Hrs  T(C): 37.1 (03 Feb 2021 17:38), Max: 37.1 (03 Feb 2021 17:38)  T(F): 98.7 (03 Feb 2021 17:38), Max: 98.7 (03 Feb 2021 17:38)  HR: 76 (03 Feb 2021 17:38) (65 - 76)  BP: 158/68 (03 Feb 2021 17:38) (89/55 - 158/68)  BP(mean): --  RR: 16 (03 Feb 2021 17:38) (16 - 18)  SpO2: 96% (03 Feb 2021 17:38) (96% - 96%)    I&O's Detail    I&O's Summary          PHYSICAL EXAM:  General: Appears well developed, well nourished, no acute distress. not in acute pain  HEAD: normal cephalic. Atraumatic  PUPILS: equal and reactive to light  EARS: normal hearing  NECK: supple. no JVD or HJR. no carotid bruits. no visible lymphadenopathy  NOSE: no gross abnormalities  CHEST: symmetric chest wall expansion  CARDIOVASCULAR: Normal rate. Regular rhythm. Normal S1 and S2, no S3/S4,  no murmur, rub, or gallop  LUNGS: Normal effort. Normal respiratory rate. Breath sounds are clear to auscultation bilaterally. No respiratory distress. No stridor.  no rales, rhonchi or wheeze. no decreased Breath sounds  ABDOMEN: Soft, nontender, non-distended, positive bowel sounds, no mass or bruit. no abdominal tenderness. No rebound. no ascites  EXTREMITIES: No clubbing, cyanosis or edema. normal range of motion  PULSES:  distal pulses WNL  SKIN: Warm and dry with normal turgor. no visible rash or cyanosis   NEURO: Alert & oriented x 3, grossly intact with no focal weakness  PSYCH: normal mood and affect. Grossly normal insight and judgement exhibited    FAMILY HISTORY:  S/P CABG (coronary artery bypass graft) (Sibling)        SOCIAL HISTORY:   active smoking. No ETOH/No IVDA    REVIEW OF SYSTEMS:  Constitutional: no fever, chills or malaise. No weight loss  Head: no trauma  Eyes: no visual deficit. No double vision  Ears: no hearing deficit or ringing in the ears  Nose: no nose bleeds or smell changes or congestion  Throat: no difficult swallowing or painful swallowing  Neck: supple. No lymphadenopathy or swelling  Respiratory: no SOB, wheeze, asthma, COPD. No cough. No blood in the sputum  Cardiovascular: no CP, palpitations, irregular heart beats. No edema. No PND. No orthopnea. No skin/temperature or color changes  Gastrointestinal: no abdominal pain. No constipation. No diarrhea. No melena. No nausea. No vomiting. No bloating  Genitourinary: no frequency or urgency. No hematuria  Lymphatics: no grossly swollen lymph nodes  Musculoskeletal: no limitation of range of motion. Normal strength. No pain  Integumentary: no visible rash. No itching  Neurologic: no HA. No TIA or stroke symptoms. No seizure. No hx of epilepsy. No tingling or numbness. No weakness. No dizziness  Psychiatric: denied. Reports appropriate mood.        LABS:                        15.9   8.81  )-----------( 211      ( 03 Feb 2021 13:10 )             45.6     02-03    134<L>  |  101  |  23.0<H>  ----------------------------<  125<H>  4.7   |  22.0  |  1.64<H>    Ca    9.2      03 Feb 2021 14:15    TPro  7.9  /  Alb  3.6  /  TBili  0.8  /  DBili  x   /  AST  28  /  ALT  34  /  AlkPhos  79  02-03    CARDIAC MARKERS ( 03 Feb 2021 17:17 )  x     / <0.01 ng/mL / x     / x     / x      CARDIAC MARKERS ( 03 Feb 2021 14:15 )  x     / <0.01 ng/mL / x     / x     / x          PT/INR - ( 03 Feb 2021 13:10 )   PT: 13.5 sec;   INR: 1.17 ratio         PTT - ( 03 Feb 2021 13:10 )  PTT:25.0 sec  serum  Lipids:         RADIOLOGY & ADDITIONAL STUDIES:    ECG: SR 64 old AWMI. old IWMI Tw inversions precordially and laterally    < from: Xray Chest 1 View-PORTABLE IMMEDIATE (02.03.21 @ 13:27) >  IMPRESSION: No acute finding or change.    < end of copied text >          ASSESSMENT AND PLAN:  In summary, LACIE GIPSON is a 70y Male with past medical history significant for       Eva Murrell MD                                                               [facility], [facility]                                                    [address], [address], [place] [address]                                                         PHONE: [phone]    FAX: [phone] [address], [address], [place] [address]                                                 PHONE: [phone]    FAX: [phone]  *******************************************************************************  cc: syncope    HPI: 70M with syncopal episode s/p smoking weed. No CP or SOB. No palpitations PND or orthopnea. 3V  CABG 10/14/14. Hx of HTN, borderline DM, HL.  Pt is currently asymptomatic but is noted to have new Tw inversions precordially as compared to ECG 11/9/20.    nuclear stress 11/4/19 fixed distal anteroapical and anteroapical defect. EF 59        INTERPRETATION OF TELEMETRY (personally reviewed): SR    PAST MEDICAL & SURGICAL HISTORY:  CAD (coronary artery disease)    Hyperlipidemia    S/P CABG x 3    Benign lipomatous tumor    S/P tonsillectomy        No Known Allergies      MEDICATIONS  (STANDING):  aspirin enteric coated 81 milliGRAM(s) Oral daily  atorvastatin 40 milliGRAM(s) Oral at bedtime  lisinopril 40 milliGRAM(s) Oral daily  metoprolol succinate ER 50 milliGRAM(s) Oral daily    MEDICATIONS  (PRN):      Vital Signs Last 24 Hrs  T(C): 37.1 (03 Feb 2021 17:38), Max: 37.1 (03 Feb 2021 17:38)  T(F): 98.7 (03 Feb 2021 17:38), Max: 98.7 (03 Feb 2021 17:38)  HR: 76 (03 Feb 2021 17:38) (65 - 76)  BP: 158/68 (03 Feb 2021 17:38) (89/55 - 158/68)  BP(mean): --  RR: 16 (03 Feb 2021 17:38) (16 - 18)  SpO2: 96% (03 Feb 2021 17:38) (96% - 96%)    I&O's Detail    I&O's Summary          PHYSICAL EXAM:  General: Appears well developed, well nourished, no acute distress. not in acute pain  HEAD: normal cephalic. Atraumatic  PUPILS: equal and reactive to light  EARS: normal hearing  NECK: supple. no JVD or HJR. no carotid bruits. no visible lymphadenopathy  NOSE: no gross abnormalities  CHEST: symmetric chest wall expansion  CARDIOVASCULAR: Normal rate. Regular rhythm. Normal S1 and S2, no S3/S4,  no murmur, rub, or gallop  LUNGS: Normal effort. Normal respiratory rate. Breath sounds are clear to auscultation bilaterally. No respiratory distress. No stridor.  no rales, rhonchi or wheeze. no decreased Breath sounds  ABDOMEN: Soft, nontender, non-distended, positive bowel sounds, no mass or bruit. no abdominal tenderness. No rebound. no ascites  EXTREMITIES: No clubbing, cyanosis or edema. normal range of motion  PULSES:  distal pulses WNL  SKIN: Warm and dry with normal turgor. no visible rash or cyanosis   NEURO: Alert & oriented x 3, grossly intact with no focal weakness  PSYCH: normal mood and affect. Grossly normal insight and judgement exhibited    FAMILY HISTORY:  S/P CABG (coronary artery bypass graft) (Sibling). Brother with CABG at age 65. No family hx of ischemic heart disease for mother or father.         SOCIAL HISTORY:   active smoking. No ETOH/No IVDA. + marijuana    REVIEW OF SYSTEMS:  Constitutional: no fever, chills or malaise. No weight loss  Head: no trauma  Eyes: no visual deficit. No double vision  Ears: no hearing deficit or ringing in the ears  Nose: no nose bleeds or smell changes or congestion  Throat: no difficult swallowing or painful swallowing  Neck: supple. No lymphadenopathy or swelling  Respiratory: no SOB, wheeze, asthma, COPD. No cough. No blood in the sputum  Cardiovascular: no CP, palpitations, irregular heart beats. No edema. No PND. No orthopnea. No skin/temperature or color changes  Gastrointestinal: no abdominal pain. No constipation. No diarrhea. No melena. No nausea. No vomiting. No bloating  Genitourinary: no frequency or urgency. No hematuria  Lymphatics: no grossly swollen lymph nodes  Musculoskeletal: no limitation of range of motion. Normal strength. No pain  Integumentary: no visible rash. No itching  Neurologic: no HA. No TIA or stroke symptoms. No seizure. No hx of epilepsy. No tingling or numbness. No weakness. No dizziness  Psychiatric: denied. Reports appropriate mood.        LABS:                        15.9   8.81  )-----------( 211      ( 03 Feb 2021 13:10 )             45.6     02-03    134<L>  |  101  |  23.0<H>  ----------------------------<  125<H>  4.7   |  22.0  |  1.64<H>    Ca    9.2      03 Feb 2021 14:15    TPro  7.9  /  Alb  3.6  /  TBili  0.8  /  DBili  x   /  AST  28  /  ALT  34  /  AlkPhos  79  02-03    CARDIAC MARKERS ( 03 Feb 2021 17:17 )  x     / <0.01 ng/mL / x     / x     / x      CARDIAC MARKERS ( 03 Feb 2021 14:15 )  x     / <0.01 ng/mL / x     / x     / x          PT/INR - ( 03 Feb 2021 13:10 )   PT: 13.5 sec;   INR: 1.17 ratio         PTT - ( 03 Feb 2021 13:10 )  PTT:25.0 sec  serum  Lipids:         RADIOLOGY & ADDITIONAL STUDIES:    ECG: SR 64 old AWMI. old IWMI Tw inversions precordially and laterally    < from: Xray Chest 1 View-PORTABLE IMMEDIATE (02.03.21 @ 13:27) >  IMPRESSION: No acute finding or change.    < end of copied text >          ASSESSMENT AND PLAN:  In summary, LACIE GIPSON is a 70y Male with past medical history significant for syncopal episode s/p smoking weed. No CP or SOB. No palpitations PND or orthopnea. 3V  CABG 10/14/14. Hx of HTN, borderline DM, HL.  Pt is currently asymptomatic but is noted to have new Tw inversions precordially as compared to ECG 11/9/20.    nuclear stress 11/4/19 fixed distal anteroapical and anteroapical defect. EF 59    - CAD. Past CABG 2014. Likely progressive ischemic heart disease. New ECG changes with the absence of cardiac sx. Will need ischemic eval.  In the absence of CV sx and 2 sets CE negative for ACS, may DC home with outpt ischemic eval via our office. Maintain ASA    - HTN. maintain outpt cardiac meds. on ramipril, metoprolol 100mg daily and HCTZ as an outpt    - Nuclear stress 11/'19 with fixed defects but no ischemia and normal LV function    - syncope in light of weed use. Defer weed use dw the pt    - Nicotine dependence. Smoking cessation dw the pt    - HL. maintain atorvastatin 40mg daily    - Telemetry monitoring personally reviewed by me. SR    - ECG personally reviewed by me    - radiologic imaging reviewed    - Laboratory data reviewed.    - I spoke with Dr Vines in the ER    - I have personally reviewed all obtainable prior records and data    - May DC home with early outpt ischemic eval via our office. Pt is in agreement with the plan    Thank you for allowing me to participate in the care of your patient    Eva Murrell MD

## 2021-02-03 NOTE — ED PROVIDER NOTE - PHYSICAL EXAMINATION
Gen: non toxic, somewhat lethargic appearing  HEENT: Mucous membranes moist, pink conjunctivae, EOMI  CV: RRR, nl s1/s2. equal pulses b/l.   Resp: CTAB, normal rate and effort  GI: Abdomen soft, NT, ND. No rebound, no guarding  : No CVAT  Neuro: A&O x 3, moving all 4 extremities  MSK: No spine or joint tenderness to palpation  Skin: No rashes. intact and perfused.

## 2021-02-03 NOTE — ED CDU PROVIDER DISPOSITION NOTE - PATIENT PORTAL LINK FT
You can access the FollowMyHealth Patient Portal offered by [facility] by registering at the following website: [email or web address]. By joining [facility]’s FollowMyHealth portal, you will also be able to view your health information using other applications (apps) compatible with our system.

## 2021-02-03 NOTE — ED CDU PROVIDER INITIAL DAY NOTE - PROGRESS NOTE DETAILS
Reviewed ECG with Dr. Vines pt advised on result of imaging and the need for fu with vascular neuro and cardiology

## 2021-02-03 NOTE — ED CDU PROVIDER DISPOSITION NOTE - PROVIDER TOKENS
PROVIDER:[TOKEN:[[id number]],FOLLOWUP:[7-10 Days]],PROVIDER:[TOKEN:[[id number]],FOLLOWUP:[7-10 Days]],PROVIDER:[TOKEN:[[id number]],FOLLOWUP:[7-10 Days]]

## 2021-02-03 NOTE — ED CDU PROVIDER INITIAL DAY NOTE - OBJECTIVE STATEMENT
69 y/o M PMHx CAD, HLD, CABG presents to ED c/o syncopal episode witnessed by wife occurring today. Pt reports he did not eat breakfast this morning, smoked marijuana which he bought off the street and then felt lightheaded and like he was "going to pass out." Pt sat down in chair and states he "closed his eyes for a little." Denies fall to ground. States this has happened before when he has smoked marijuana. Denies cp/sob, abd pain, f/c, headache, any other symptoms.

## 2021-02-03 NOTE — ED CDU PROVIDER INITIAL DAY NOTE - MEDICAL DECISION MAKING DETAILS
71 y/o M PMHx CAD, HLD, CABG presents to ED c/o syncopal episode witnessed by wife occurring today after not eat and smoking marijuana. ECG with [facility]. [facility] consulted.  -Placed in observation for tele monitoring, serial troponin, US carotid and Cardiology consult

## 2021-02-03 NOTE — ED CDU PROVIDER DISPOSITION NOTE - CARE PROVIDERS DIRECT ADDRESSES
[email or web address]

## 2021-02-03 NOTE — ED CDU PROVIDER DISPOSITION NOTE - NSFOLLOWUPINSTRUCTIONS_ED_ALL_ED_FT
please follow with neurology. vascular. and cardiology  resume all prescribed medications   new or worsening symptoms return to the ER immediately

## 2021-02-03 NOTE — ED CDU PROVIDER DISPOSITION NOTE - CARE PROVIDER_API CALL
Roland Watson; PhD)  Neurology; Vascular Neurology  [address], [address]  [place] [address]  Phone: [phone]  Fax: [phone]  Follow Up Time: 7-10 Days    Nicole Hopkins (NP; RN)  NP in Adult Health  [address] [place] [address]  Phone: [phone]  Fax: [phone]  Follow Up Time: 7-10 Days    ManvarSingh, Pallavi B (MD)  Vascular Surgery  [address], [address] [place] [address]  Phone: [phone]  Fax: [phone]  Follow Up Time: 7-10 Days

## 2021-02-03 NOTE — ED CDU PROVIDER INITIAL DAY NOTE - FAMILY HISTORY
Sibling  Still living? Yes, Estimated age: 61-70  S/P CABG (coronary artery bypass graft), Age at diagnosis: Age Unknown

## 2021-02-03 NOTE — ED PROVIDER NOTE - OBJECTIVE STATEMENT
69 y/o male hx cad s/p cabg in 2014, htn c/o syncope/feeling weak and tired for 1-2 hour PTA after smoking thc he bought of the street. States could have been laced. Denies cp/sob, abd pain, f/c, headache, any other symptoms.     ROS: No fever/chills. No eye pain/changes in vision, No ear pain/sore throat/dysphagia, No chest pain/palpitations. No SOB/cough/. No abdominal pain, N/V/D, no black/bloody bm. No dysuria/frequency/discharge, No headache. No Dizziness.    No rashes or breaks in skin. No numbness/tingling/weakness.

## 2021-02-09 ENCOUNTER — APPOINTMENT (OUTPATIENT)
Dept: DERMATOLOGY | Facility: CLINIC | Age: 71
End: 2021-02-09

## 2021-10-22 ENCOUNTER — APPOINTMENT (OUTPATIENT)
Dept: OPHTHALMOLOGY | Facility: CLINIC | Age: 71
End: 2021-10-22
Payer: MEDICARE

## 2021-10-22 ENCOUNTER — NON-APPOINTMENT (OUTPATIENT)
Age: 71
End: 2021-10-22

## 2021-10-22 PROCEDURE — 92136 OPHTHALMIC BIOMETRY: CPT

## 2021-10-22 PROCEDURE — 92133 CPTRZD OPH DX IMG PST SGM ON: CPT

## 2021-10-22 PROCEDURE — 92004 COMPRE OPH EXAM NEW PT 1/>: CPT

## 2021-10-25 ENCOUNTER — NON-APPOINTMENT (OUTPATIENT)
Age: 71
End: 2021-10-25

## 2021-11-10 DIAGNOSIS — Z01.818 ENCOUNTER FOR OTHER PREPROCEDURAL EXAMINATION: ICD-10-CM

## 2021-11-12 ENCOUNTER — APPOINTMENT (OUTPATIENT)
Dept: DISASTER EMERGENCY | Facility: CLINIC | Age: 71
End: 2021-11-12

## 2021-11-13 LAB — SARS-COV-2 N GENE NPH QL NAA+PROBE: NOT DETECTED

## 2021-11-15 ENCOUNTER — APPOINTMENT (OUTPATIENT)
Dept: OPHTHALMOLOGY | Facility: AMBULATORY SURGERY CENTER | Age: 71
End: 2021-11-15
Payer: MEDICARE

## 2021-11-15 PROCEDURE — 66984 XCAPSL CTRC RMVL W/O ECP: CPT | Mod: RT,79

## 2021-11-16 ENCOUNTER — NON-APPOINTMENT (OUTPATIENT)
Age: 71
End: 2021-11-16

## 2021-11-16 ENCOUNTER — APPOINTMENT (OUTPATIENT)
Dept: OPHTHALMOLOGY | Facility: CLINIC | Age: 71
End: 2021-11-16
Payer: MEDICARE

## 2021-11-16 PROCEDURE — 99024 POSTOP FOLLOW-UP VISIT: CPT

## 2021-11-24 ENCOUNTER — APPOINTMENT (OUTPATIENT)
Dept: OPHTHALMOLOGY | Facility: CLINIC | Age: 71
End: 2021-11-24
Payer: MEDICARE

## 2021-11-24 ENCOUNTER — NON-APPOINTMENT (OUTPATIENT)
Age: 71
End: 2021-11-24

## 2021-11-24 PROCEDURE — 99024 POSTOP FOLLOW-UP VISIT: CPT

## 2021-12-15 ENCOUNTER — APPOINTMENT (OUTPATIENT)
Dept: OPHTHALMOLOGY | Facility: CLINIC | Age: 71
End: 2021-12-15
Payer: MEDICARE

## 2021-12-15 ENCOUNTER — NON-APPOINTMENT (OUTPATIENT)
Age: 71
End: 2021-12-15

## 2021-12-15 PROCEDURE — 99024 POSTOP FOLLOW-UP VISIT: CPT

## 2022-01-11 ENCOUNTER — NON-APPOINTMENT (OUTPATIENT)
Age: 72
End: 2022-01-11

## 2022-01-11 ENCOUNTER — APPOINTMENT (OUTPATIENT)
Dept: OPHTHALMOLOGY | Facility: CLINIC | Age: 72
End: 2022-01-11
Payer: MEDICARE

## 2022-01-11 PROCEDURE — 99024 POSTOP FOLLOW-UP VISIT: CPT

## 2022-03-16 ENCOUNTER — NON-APPOINTMENT (OUTPATIENT)
Age: 72
End: 2022-03-16

## 2022-03-16 ENCOUNTER — APPOINTMENT (OUTPATIENT)
Dept: OPHTHALMOLOGY | Facility: CLINIC | Age: 72
End: 2022-03-16
Payer: MEDICARE

## 2022-03-16 PROCEDURE — 92083 EXTENDED VISUAL FIELD XM: CPT

## 2022-03-16 PROCEDURE — 92012 INTRM OPH EXAM EST PATIENT: CPT

## 2022-03-16 PROCEDURE — 76514 ECHO EXAM OF EYE THICKNESS: CPT

## 2022-10-19 ENCOUNTER — APPOINTMENT (OUTPATIENT)
Dept: OPHTHALMOLOGY | Facility: CLINIC | Age: 72
End: 2022-10-19

## 2024-05-16 ENCOUNTER — APPOINTMENT (OUTPATIENT)
Dept: NEUROLOGY | Facility: CLINIC | Age: 74
End: 2024-05-16
Payer: MEDICARE

## 2024-05-16 VITALS
DIASTOLIC BLOOD PRESSURE: 70 MMHG | OXYGEN SATURATION: 98 % | WEIGHT: 181 LBS | SYSTOLIC BLOOD PRESSURE: 126 MMHG | HEART RATE: 51 BPM | HEIGHT: 70 IN | BODY MASS INDEX: 25.91 KG/M2

## 2024-05-16 DIAGNOSIS — R68.89 OTHER GENERAL SYMPTOMS AND SIGNS: ICD-10-CM

## 2024-05-16 PROCEDURE — 99204 OFFICE O/P NEW MOD 45 MIN: CPT

## 2024-05-16 PROCEDURE — G2211 COMPLEX E/M VISIT ADD ON: CPT

## 2024-05-16 RX ORDER — ASPIRIN 81 MG/1
81 TABLET ORAL
Refills: 0 | Status: ACTIVE | COMMUNITY

## 2024-05-16 RX ORDER — METOPROLOL TARTRATE 25 MG/1
25 TABLET, FILM COATED ORAL
Refills: 0 | Status: ACTIVE | COMMUNITY

## 2024-05-16 RX ORDER — RAMIPRIL 10 MG/1
10 CAPSULE ORAL
Refills: 0 | Status: ACTIVE | COMMUNITY

## 2024-05-16 NOTE — PHYSICAL EXAM
[___ / 5] : Visuospatial / Executive: [unfilled] / 5 [0 / 0] : Memory: 0 / 0 [___ / 3] : Attention (Serial 7 subtraction): [unfilled] / 3 [___ / 1] : Fluency: [unfilled] / 1 [___ / 2] : Abstraction: [unfilled] / 2 [___ / 5] : Delayed Recall: [unfilled] / 5 [___ / 6] : Orientation: [unfilled] / 6 [FreeTextEntry1] : GENERAL PHYSICAL EXAM: GEN: no distress, normal affect EYES: sclera white, conjunctiva clear, no nystagmus CV: normal rhythm PULM: no respiratory distress, normal rhythm and effort EXT: no edema, no cyanosis MSK: muscle tone and strength normal SKIN: warm, dry, no rash or lesion on exposed skin   NEUROLOGICAL EXAM: Mental Status Orientation: alert and oriented to person, place, time, and situation Language: clear and fluent, intact comprehension and repetition  Cranial Nerves II: visual fields full to confrontation  III, IV, VI: PERRL, EOMI V, VII: facial sensation and movement intact and symmetric  VIII: hearing intact  IX, X: uvula midline, soft palate elevates normally  XI: BL shoulder shrug intact  XII: tongue midline  Motor Shoulder abd: 5 (R), 5 (L) EF/EE: 5 (R), 5 (L) WF/WE: 5 (R), 5 (L) hand : 5 (R), 5 (L) HF/HE: 5 (R), 5 (L) KF/KE: 5 (R), 5 (L) DF/PF: 5 (R), 5 (L)  Tone and bulk are normal in upper and lower limbs No pronator drift  Sensation Intact to light touch in all 4 EXTs  Reflex 2+ in BL biceps, brachioradialis, patella  Coordination Normal FTN bilaterally Able to perform rapid, alternating movements  Gait Normal stance, stride, and pivot turn [[employer]oca[employer]] : 27

## 2024-05-16 NOTE — DISCUSSION/SUMMARY
[FreeTextEntry1] : Patient is a 73 year-old man with PMH HLD who presents today for evaluation of memory loss. MoCA 27/30 with difficulties in delayed recall. We discussed most likely causes of his symptoms, which include distraction vs. age related cognitive decline. We discussed use of a timer when using the stove and/or abstaining from using the stove and use of a microwave or air fryer. We also discussed the importance of social interaction and I recommend he visit his local [employer] [facility]. We discussed the importance of exercise, eating a healthy diet, management of BP, cholesterol and glucose. He will follow-up with me as needed. All of his questions and concerns were addressed.

## 2024-05-16 NOTE — HISTORY OF PRESENT ILLNESS
[FreeTextEntry1] : Patient is a 73 year-old man with PMH HLD who presents today for evaluation of memory loss x about 1 year. Symptoms began gradually. He reports often forgetting what he's going into rooms for, occasionally misplaces items and has forgotten to turn off the stove after cooking in the past. He denies getting lost while driving to familiar locations, is able to carry out ADLs independently and can manage finances and go shopping on his own. He denies accidents or falls at home. He has no difficulty following conversations with groups of people and denies hearing loss. He stays active and tries to eat a healthy diet.